# Patient Record
Sex: MALE | Race: WHITE | NOT HISPANIC OR LATINO | Employment: FULL TIME | URBAN - METROPOLITAN AREA
[De-identification: names, ages, dates, MRNs, and addresses within clinical notes are randomized per-mention and may not be internally consistent; named-entity substitution may affect disease eponyms.]

---

## 2024-05-14 ENCOUNTER — LAB (OUTPATIENT)
Dept: LAB | Facility: LAB | Age: 64
End: 2024-05-14
Payer: COMMERCIAL

## 2024-05-14 ENCOUNTER — OFFICE VISIT (OUTPATIENT)
Dept: PRIMARY CARE | Facility: CLINIC | Age: 64
End: 2024-05-14
Payer: COMMERCIAL

## 2024-05-14 VITALS
OXYGEN SATURATION: 98 % | SYSTOLIC BLOOD PRESSURE: 136 MMHG | DIASTOLIC BLOOD PRESSURE: 86 MMHG | HEIGHT: 65 IN | WEIGHT: 205 LBS | HEART RATE: 69 BPM | BODY MASS INDEX: 34.16 KG/M2

## 2024-05-14 DIAGNOSIS — Z13.6 SCREENING FOR HEART DISEASE: ICD-10-CM

## 2024-05-14 DIAGNOSIS — J30.89 ENVIRONMENTAL AND SEASONAL ALLERGIES: ICD-10-CM

## 2024-05-14 DIAGNOSIS — Z12.5 SCREENING FOR PROSTATE CANCER: ICD-10-CM

## 2024-05-14 DIAGNOSIS — R53.83 OTHER FATIGUE: Primary | ICD-10-CM

## 2024-05-14 DIAGNOSIS — M54.2 NECK PAIN: ICD-10-CM

## 2024-05-14 DIAGNOSIS — Z12.11 ENCOUNTER FOR SCREENING FOR MALIGNANT NEOPLASM OF COLON: ICD-10-CM

## 2024-05-14 DIAGNOSIS — R53.83 OTHER FATIGUE: ICD-10-CM

## 2024-05-14 LAB
ALBUMIN SERPL BCP-MCNC: 4.3 G/DL (ref 3.4–5)
ALP SERPL-CCNC: 42 U/L (ref 33–136)
ALT SERPL W P-5'-P-CCNC: 17 U/L (ref 10–52)
ANION GAP SERPL CALC-SCNC: 9 MMOL/L (ref 10–20)
AST SERPL W P-5'-P-CCNC: 17 U/L (ref 9–39)
BASOPHILS # BLD AUTO: 0.03 X10*3/UL (ref 0–0.1)
BASOPHILS NFR BLD AUTO: 0.5 %
BILIRUB SERPL-MCNC: 1.4 MG/DL (ref 0–1.2)
BUN SERPL-MCNC: 22 MG/DL (ref 6–23)
CALCIUM SERPL-MCNC: 8.7 MG/DL (ref 8.6–10.3)
CHLORIDE SERPL-SCNC: 107 MMOL/L (ref 98–107)
CO2 SERPL-SCNC: 28 MMOL/L (ref 21–32)
CREAT SERPL-MCNC: 1.12 MG/DL (ref 0.5–1.3)
EGFRCR SERPLBLD CKD-EPI 2021: 75 ML/MIN/1.73M*2
EOSINOPHIL # BLD AUTO: 0.18 X10*3/UL (ref 0–0.7)
EOSINOPHIL NFR BLD AUTO: 3.3 %
ERYTHROCYTE [DISTWIDTH] IN BLOOD BY AUTOMATED COUNT: 13.1 % (ref 11.5–14.5)
GLUCOSE SERPL-MCNC: 109 MG/DL (ref 74–99)
HCT VFR BLD AUTO: 45.3 % (ref 41–52)
HGB BLD-MCNC: 14.9 G/DL (ref 13.5–17.5)
IMM GRANULOCYTES # BLD AUTO: 0.01 X10*3/UL (ref 0–0.7)
IMM GRANULOCYTES NFR BLD AUTO: 0.2 % (ref 0–0.9)
LYMPHOCYTES # BLD AUTO: 1.33 X10*3/UL (ref 1.2–4.8)
LYMPHOCYTES NFR BLD AUTO: 24.3 %
MCH RBC QN AUTO: 28.9 PG (ref 26–34)
MCHC RBC AUTO-ENTMCNC: 32.9 G/DL (ref 32–36)
MCV RBC AUTO: 88 FL (ref 80–100)
MONOCYTES # BLD AUTO: 0.47 X10*3/UL (ref 0.1–1)
MONOCYTES NFR BLD AUTO: 8.6 %
NEUTROPHILS # BLD AUTO: 3.45 X10*3/UL (ref 1.2–7.7)
NEUTROPHILS NFR BLD AUTO: 63.1 %
NRBC BLD-RTO: 0 /100 WBCS (ref 0–0)
PLATELET # BLD AUTO: 197 X10*3/UL (ref 150–450)
POTASSIUM SERPL-SCNC: 4.1 MMOL/L (ref 3.5–5.3)
PROT SERPL-MCNC: 6.2 G/DL (ref 6.4–8.2)
PSA SERPL-MCNC: 1.19 NG/ML
RBC # BLD AUTO: 5.15 X10*6/UL (ref 4.5–5.9)
SODIUM SERPL-SCNC: 140 MMOL/L (ref 136–145)
TSH SERPL-ACNC: 3.1 MIU/L (ref 0.44–3.98)
WBC # BLD AUTO: 5.5 X10*3/UL (ref 4.4–11.3)

## 2024-05-14 PROCEDURE — 36415 COLL VENOUS BLD VENIPUNCTURE: CPT

## 2024-05-14 PROCEDURE — 85025 COMPLETE CBC W/AUTO DIFF WBC: CPT

## 2024-05-14 PROCEDURE — 84443 ASSAY THYROID STIM HORMONE: CPT

## 2024-05-14 PROCEDURE — 1036F TOBACCO NON-USER: CPT | Performed by: INTERNAL MEDICINE

## 2024-05-14 PROCEDURE — 80053 COMPREHEN METABOLIC PANEL: CPT

## 2024-05-14 PROCEDURE — 84153 ASSAY OF PSA TOTAL: CPT

## 2024-05-14 PROCEDURE — 99203 OFFICE O/P NEW LOW 30 MIN: CPT | Performed by: INTERNAL MEDICINE

## 2024-05-14 ASSESSMENT — PATIENT HEALTH QUESTIONNAIRE - PHQ9
2. FEELING DOWN, DEPRESSED OR HOPELESS: NOT AT ALL
SUM OF ALL RESPONSES TO PHQ9 QUESTIONS 1 AND 2: 0
1. LITTLE INTEREST OR PLEASURE IN DOING THINGS: NOT AT ALL

## 2024-05-14 ASSESSMENT — ENCOUNTER SYMPTOMS
UNEXPECTED WEIGHT CHANGE: 0
ABDOMINAL PAIN: 0
VOMITING: 0
SHORTNESS OF BREATH: 0
FATIGUE: 1
BLOOD IN STOOL: 0
DIARRHEA: 0
PALPITATIONS: 0
BACK PAIN: 0
CHEST TIGHTNESS: 0
WHEEZING: 0
NAUSEA: 0
ARTHRALGIAS: 0
COUGH: 0

## 2024-05-14 NOTE — ASSESSMENT & PLAN NOTE
-He injured his neck on a tree branch when he was mowing and he is currently seeing a chiropractor for care.  He states he still has some soreness and occasionally he feels some fleeting dizziness or lightheadedness  -We will discuss his symptoms in more detail when he returns

## 2024-05-14 NOTE — ASSESSMENT & PLAN NOTE
-He is taking an over-the-counter antihistamine and at this time he feels like it is satisfactorily controlling his symptoms

## 2024-05-14 NOTE — PROGRESS NOTES
Subjective   Patient ID: Davey Reed is a 63 y.o. male who presents for [facility].  HPI  He is here today to get reestablished.  He indicates that he was coming to see me over a decade ago and then just got busy and was not getting regular healthcare checkups.  He does admit to having seen a dermatologist this last year for his freckles and moles and he did have a few lesions removed.  He also had a cholesterol blood test done through his workplace and as he recalls it came back okay.  He will try to bring a copy for his chart here.  We reviewed his past medical history and he states that he has been doing okay with the exception of some allergies which he experiences all year-round.  Is particularly worse in the summer and part of his work is mowing lawns.  He states he is taking an over-the-counter medication right now which she feels is satisfactory.  He also states that he injured his neck recently when he was mowing and he ran into a tree branch.  He states his neck has been sore but has been following with a chiropractor and he feels like he is getting better.  He states occasionally he feels a little bit lightheaded when he became concerned about his heart.  He states his mother had heart problems but he does not exactly remember what it was.  I have decided to order a coronary calcium scan and of course we will be doing lab work.  He will also bring me a copy of his cholesterol profile.  We talked about cancer screening and he is agreeable to scheduling with GI for colonoscopy and we will also do a PSA for prostate cancer screening.  We will see him back in follow-up.  Review of Systems   Constitutional:  Positive for fatigue. Negative for unexpected weight change.   Respiratory:  Negative for cough, chest tightness, shortness of breath and wheezing.    Cardiovascular:  Negative for chest pain, palpitations and leg swelling.   Gastrointestinal:  Negative for abdominal pain, blood in stool,  diarrhea, nausea and vomiting.   Musculoskeletal:  Negative for arthralgias and back pain.     Objective   Physical Exam  Vitals and nursing note reviewed.   Constitutional:       General: He is not in acute distress.     Appearance: Normal appearance.   HENT:      Head: Normocephalic and atraumatic.      Right Ear: Tympanic membrane normal.      Left Ear: Tympanic membrane normal.      Mouth/Throat:      Mouth: Mucous membranes are moist.   Eyes:      Conjunctiva/sclera: Conjunctivae normal.   Cardiovascular:      Rate and Rhythm: Normal rate and regular rhythm.      Heart sounds: Normal heart sounds.   Pulmonary:      Effort: No respiratory distress.      Breath sounds: No wheezing.   Abdominal:      Palpations: Abdomen is soft.      Tenderness: There is no abdominal tenderness. There is no guarding.   Musculoskeletal:         General: No swelling. Normal range of motion.   Skin:     General: Skin is warm and dry.   Neurological:      General: No focal deficit present.      Mental Status: He is alert and oriented to person, place, and time.   Psychiatric:         Behavior: Behavior normal.       Assessment/Plan   Problem List Items Addressed This Visit             ICD-10-CM    Other fatigue - Primary R53.83    Relevant Orders    Comprehensive Metabolic Panel    CBC and Auto Differential    TSH    Screening for heart disease Z13.6     -He states he thinks his mother had heart issues so I will be ordering a coronary calcium scan and we will see him back to go over the result         Relevant Orders    CT cardiac scoring wo IV contrast    Environmental and seasonal allergies J30.89     -He is taking an over-the-counter antihistamine and at this time he feels like it is satisfactorily controlling his symptoms         Neck pain M54.2     -He injured his neck on a tree branch when he was mowing and he is currently seeing a chiropractor for care.  He states he still has some soreness and occasionally he feels some  fleeting dizziness or lightheadedness  -We will discuss his symptoms in more detail when he returns               Tg Scott, DO

## 2024-05-14 NOTE — ASSESSMENT & PLAN NOTE
-He states he thinks his mother had heart issues so I will be ordering a coronary calcium scan and we will see him back to go over the result

## 2024-05-14 NOTE — PATIENT INSTRUCTIONS
I am delighted that you are here to get caught up with your healthcare and we have ordered several things for your assessment  Please remember to bring a copy of your recent cholesterol profile  I have ordered some lab work to be done and I would like for you to go fasting.  Fasting means not having anything with calories for 8 to 12 hours prior to your lab draw.  This means if you decided to go to the lab at 7 AM in the morning you would simply cut off anything with calories after 7 PM the night before.  You can have water and in fact we encourage you to drink lots of water so that the blood drawl will be easier  Please try to get this lab done at your earliest convenience so that we can go over the results when you come back  The staff will be helping you to schedule your coronary calcium scan which is free of charge and this is a tool to screen for heart disease.  I would like to see you back after all of these tests get done  The staff will also be helping you to schedule with gastroenterology to get your screening colonoscopy

## 2024-05-15 DIAGNOSIS — Z12.11 COLON CANCER SCREENING: ICD-10-CM

## 2024-05-23 ENCOUNTER — HOSPITAL ENCOUNTER (OUTPATIENT)
Dept: RADIOLOGY | Facility: HOSPITAL | Age: 64
Discharge: HOME | End: 2024-05-23
Payer: COMMERCIAL

## 2024-05-23 DIAGNOSIS — Z13.6 SCREENING FOR HEART DISEASE: ICD-10-CM

## 2024-05-23 PROCEDURE — 75571 CT HRT W/O DYE W/CA TEST: CPT

## 2024-05-29 ENCOUNTER — OFFICE VISIT (OUTPATIENT)
Dept: PRIMARY CARE | Facility: CLINIC | Age: 64
End: 2024-05-29
Payer: COMMERCIAL

## 2024-05-29 VITALS
SYSTOLIC BLOOD PRESSURE: 132 MMHG | DIASTOLIC BLOOD PRESSURE: 82 MMHG | WEIGHT: 206 LBS | OXYGEN SATURATION: 97 % | HEIGHT: 65 IN | BODY MASS INDEX: 34.32 KG/M2 | HEART RATE: 64 BPM

## 2024-05-29 DIAGNOSIS — R10.31 RIGHT INGUINAL PAIN: ICD-10-CM

## 2024-05-29 DIAGNOSIS — Z12.5 SCREENING FOR PROSTATE CANCER: ICD-10-CM

## 2024-05-29 DIAGNOSIS — R73.9 HYPERGLYCEMIA: ICD-10-CM

## 2024-05-29 DIAGNOSIS — E78.5 HYPERLIPIDEMIA, UNSPECIFIED HYPERLIPIDEMIA TYPE: Primary | ICD-10-CM

## 2024-05-29 PROBLEM — Z13.6 SCREENING FOR HEART DISEASE: Status: RESOLVED | Noted: 2024-05-14 | Resolved: 2024-05-29

## 2024-05-29 PROCEDURE — 1036F TOBACCO NON-USER: CPT | Performed by: INTERNAL MEDICINE

## 2024-05-29 PROCEDURE — 99214 OFFICE O/P EST MOD 30 MIN: CPT | Performed by: INTERNAL MEDICINE

## 2024-05-29 NOTE — ASSESSMENT & PLAN NOTE
-As we were finishing his note he indicated that he is having some groin pain and points to the right groin region.  He states that approximately 10 years ago he had a prior herniorrhaphy and he is having some discomfort there.  He states he is not sure if he just simply strained the area because of the way he moves or slipped but despite the passage of time he continues to have symptoms for the past month.  When I palpate the region I can see the scar and I do not feel a mass but I decided to send him for CT scan and I will be calling him with results

## 2024-05-29 NOTE — PATIENT INSTRUCTIONS
As we discussed overall I am pleased with your test results today  The staff will be giving you a handout that goes over ways to improve your cholesterol and please read over these carefully.  We invite you to return in 1 year and just prior to that visit we will be checking your cholesterol.  We will also be checking your blood sugar and please remember to go fasting for the lab work.  We will also be checking a PSA which is recommended once a year for men.  Please do not hesitate to reach out if you have any questions or concerns and I am glad that you are scheduled to get your colonoscopy  As we discussed the staff will be trying to get an approval for a CT scan of your right groin region and once the results are known I will contact you.  If you do not hear from me within several days of your CT scan please call if you do not hear from our office about scheduling a CT scan please call

## 2024-05-29 NOTE — ASSESSMENT & PLAN NOTE
-He will work on dietary modification and were giving him handouts today  -We will recheck his cholesterol in 1 year just prior to his follow-up visit

## 2024-05-29 NOTE — PROGRESS NOTES
Subjective   Patient ID: Davey Reed is a 63 y.o. male who presents for Follow-up (2 week FUV. Still complains about a possible hernia pain on his right lower side, ).  HPI  He is here today for follow-up.  He has completed his testing and we first went over his coronary calcium scan.  His composite score was only 0.31.  We talked about the significance of these findings and his risk.  He understands that this test is not 100% but it is most of the time highly predictive.  He also was able to provide a copy of his recent cholesterol profile and for the most part his numbers looked satisfactory.  His triglycerides were up slightly.  I will give him a handout today that goes over ways to reduce his cholesterol and reduce his risk of heart disease in the future.  We also reviewed his most recent laboratory test results and his blood glucose was slightly raised at 109.  I do remind him that eating a healthy diet, exercising regularly, and staying close to ideal body weight will reduce his risk of heart disease in the future.  He is scheduled to have his colonoscopy in the near future.  We decided that he can safely return on an annual basis and sooner if any problems.   At the end of our session he had 1 other concerns.  He has been experiencing some pain and he points to his right groin region where he has had prior hernia surgery.  He had a herniorrhaphy approximately 10 years ago and he is feeling discomfort in that region.  He states he thinks he might have strained his groin when he slipped or moved a certain way but he states the symptoms have been present for a month and do not resolve.  I will be sending him for a CT scan and we will call him with results.  Objective   Physical Exam  Vitals and nursing note reviewed.   Constitutional:       General: He is not in acute distress.     Appearance: Normal appearance.   HENT:      Head: Normocephalic and atraumatic.   Eyes:      Conjunctiva/sclera: Conjunctivae  normal.   Cardiovascular:      Rate and Rhythm: Normal rate and regular rhythm.      Heart sounds: Normal heart sounds.   Pulmonary:      Effort: No respiratory distress.      Breath sounds: No wheezing.   Abdominal:      Palpations: Abdomen is soft.      Tenderness: There is no abdominal tenderness. There is no guarding.   Musculoskeletal:         General: No swelling. Normal range of motion.   Skin:     General: Skin is warm and dry.   Neurological:      General: No focal deficit present.      Mental Status: He is alert and oriented to person, place, and time.   Psychiatric:         Behavior: Behavior normal.       Recent Results (from the past 672 hour(s))   Comprehensive Metabolic Panel    Collection Time: 05/14/24 10:08 AM   Result Value Ref Range    Glucose 109 (H) 74 - 99 mg/dL    Sodium 140 136 - 145 mmol/L    Potassium 4.1 3.5 - 5.3 mmol/L    Chloride 107 98 - 107 mmol/L    Bicarbonate 28 21 - 32 mmol/L    Anion Gap 9 (L) 10 - 20 mmol/L    Urea Nitrogen 22 6 - 23 mg/dL    Creatinine 1.12 0.50 - 1.30 mg/dL    eGFR 75 >60 mL/min/1.73m*2    Calcium 8.7 8.6 - 10.3 mg/dL    Albumin 4.3 3.4 - 5.0 g/dL    Alkaline Phosphatase 42 33 - 136 U/L    Total Protein 6.2 (L) 6.4 - 8.2 g/dL    AST 17 9 - 39 U/L    Bilirubin, Total 1.4 (H) 0.0 - 1.2 mg/dL    ALT 17 10 - 52 U/L   CBC and Auto Differential    Collection Time: 05/14/24 10:08 AM   Result Value Ref Range    WBC 5.5 4.4 - 11.3 x10*3/uL    nRBC 0.0 0.0 - 0.0 /100 WBCs    RBC 5.15 4.50 - 5.90 x10*6/uL    Hemoglobin 14.9 13.5 - 17.5 g/dL    Hematocrit 45.3 41.0 - 52.0 %    MCV 88 80 - 100 fL    MCH 28.9 26.0 - 34.0 pg    MCHC 32.9 32.0 - 36.0 g/dL    RDW 13.1 11.5 - 14.5 %    Platelets 197 150 - 450 x10*3/uL    Neutrophils % 63.1 40.0 - 80.0 %    Immature Granulocytes %, Automated 0.2 0.0 - 0.9 %    Lymphocytes % 24.3 13.0 - 44.0 %    Monocytes % 8.6 2.0 - 10.0 %    Eosinophils % 3.3 0.0 - 6.0 %    Basophils % 0.5 0.0 - 2.0 %    Neutrophils Absolute 3.45 1.20 - 7.70  x10*3/uL    Immature Granulocytes Absolute, Automated 0.01 0.00 - 0.70 x10*3/uL    Lymphocytes Absolute 1.33 1.20 - 4.80 x10*3/uL    Monocytes Absolute 0.47 0.10 - 1.00 x10*3/uL    Eosinophils Absolute 0.18 0.00 - 0.70 x10*3/uL    Basophils Absolute 0.03 0.00 - 0.10 x10*3/uL   TSH    Collection Time: 05/14/24 10:08 AM   Result Value Ref Range    Thyroid Stimulating Hormone 3.10 0.44 - 3.98 mIU/L   Prostate Spec.Ag,Screen    Collection Time: 05/14/24 10:08 AM   Result Value Ref Range    Prostate Specific Antigen,Screen 1.19 <=4.00 ng/mL       Assessment/Plan   Problem List Items Addressed This Visit             ICD-10-CM    Screening for prostate cancer Z12.5     -His PSA was in satisfactory range of 1.19 and we will check it again in 1 year         Relevant Orders    Prostate Spec.Ag,Screen    Hyperlipidemia - Primary E78.5     -He will work on dietary modification and were giving him handouts today  -We will recheck his cholesterol in 1 year just prior to his follow-up visit         Relevant Orders    Lipid Panel    Hemoglobin A1C    Hyperglycemia R73.9     -His fasting glucose was 109 and we talked about the importance of eating a healthy diet, exercising regularly, and trying to get as close to ideal body weight as possible  We will be checking a fasting glucose and hemoglobin A1c just prior to his next follow-up visit-         Relevant Orders    Basic Metabolic Panel    Hemoglobin A1C    Right inguinal pain R10.31     -As we were finishing his note he indicated that he is having some groin pain and points to the right groin region.  He states that approximately 10 years ago he had a prior herniorrhaphy and he is having some discomfort there.  He states he is not sure if he just simply strained the area because of the way he moves or slipped but despite the passage of time he continues to have symptoms for the past month.  When I palpate the region I can see the scar and I do not feel a mass but I decided to send  him for CT scan and I will be calling him with results         Relevant Orders    CT abdomen pelvis w and wo IV contrast          Tg Scott, DO

## 2024-05-29 NOTE — ASSESSMENT & PLAN NOTE
-His fasting glucose was 109 and we talked about the importance of eating a healthy diet, exercising regularly, and trying to get as close to ideal body weight as possible  We will be checking a fasting glucose and hemoglobin A1c just prior to his next follow-up visit-

## 2024-06-03 ENCOUNTER — TELEPHONE (OUTPATIENT)
Dept: PRIMARY CARE | Facility: CLINIC | Age: 64
End: 2024-06-03
Payer: COMMERCIAL

## 2024-06-03 DIAGNOSIS — R10.31 RIGHT INGUINAL PAIN: Primary | ICD-10-CM

## 2024-06-03 NOTE — TELEPHONE ENCOUNTER
Patient's insurance denied prior authorization for CT abd/pelvis. They are requiring an ultrasound be done first.    Next step?

## 2024-06-07 ENCOUNTER — HOSPITAL ENCOUNTER (OUTPATIENT)
Dept: RADIOLOGY | Facility: HOSPITAL | Age: 64
Discharge: HOME | End: 2024-06-07
Payer: COMMERCIAL

## 2024-06-07 DIAGNOSIS — R10.31 RIGHT INGUINAL PAIN: ICD-10-CM

## 2024-06-07 PROCEDURE — 76882 US LMTD JT/FCL EVL NVASC XTR: CPT | Performed by: STUDENT IN AN ORGANIZED HEALTH CARE EDUCATION/TRAINING PROGRAM

## 2024-06-07 PROCEDURE — 76882 US LMTD JT/FCL EVL NVASC XTR: CPT

## 2024-06-11 NOTE — RESULT ENCOUNTER NOTE
I called his house to let him know about his ultrasound results and his wife Lucia answered the phone.  She states he works third shift and is still asleep.  She is his HIPAA contact.  I explained to her that the ultrasound of his groin did not show any evidence of a hernia.  We talked about the possibility of his hip being the culprit and when he wakes up she is going to give him the message about the ultrasound and ask him to relay to us how he is doing with his symptoms.  If his symptoms are resolving or almost resolved then we do not need to do anything else but if he still has significant discomfort I have suggested we do an x-ray.  She states she will make sure he gets back with us regarding that issue.

## 2024-06-21 ENCOUNTER — HOSPITAL ENCOUNTER (OUTPATIENT)
Dept: GASTROENTEROLOGY | Facility: CLINIC | Age: 64
Setting detail: OUTPATIENT SURGERY
Discharge: HOME | End: 2024-06-21
Payer: COMMERCIAL

## 2024-06-21 VITALS
RESPIRATION RATE: 16 BRPM | BODY MASS INDEX: 32.24 KG/M2 | SYSTOLIC BLOOD PRESSURE: 118 MMHG | HEART RATE: 68 BPM | HEIGHT: 66 IN | DIASTOLIC BLOOD PRESSURE: 76 MMHG | TEMPERATURE: 98.1 F | OXYGEN SATURATION: 95 % | WEIGHT: 200.62 LBS

## 2024-06-21 DIAGNOSIS — Z12.11 COLON CANCER SCREENING: ICD-10-CM

## 2024-06-21 DIAGNOSIS — Z12.11 ENCOUNTER FOR SCREENING FOR MALIGNANT NEOPLASM OF COLON: ICD-10-CM

## 2024-06-21 PROCEDURE — 2500000004 HC RX 250 GENERAL PHARMACY W/ HCPCS (ALT 636 FOR OP/ED): Performed by: INTERNAL MEDICINE

## 2024-06-21 PROCEDURE — 45378 DIAGNOSTIC COLONOSCOPY: CPT | Performed by: INTERNAL MEDICINE

## 2024-06-21 PROCEDURE — 3700000012 HC SEDATION LEVEL 5+ TIME - INITIAL 15 MINUTES 5/> YEARS

## 2024-06-21 PROCEDURE — 7100000009 HC PHASE TWO TIME - INITIAL BASE CHARGE

## 2024-06-21 PROCEDURE — 7100000010 HC PHASE TWO TIME - EACH INCREMENTAL 1 MINUTE

## 2024-06-21 RX ORDER — MEPERIDINE HYDROCHLORIDE 25 MG/ML
INJECTION INTRAMUSCULAR; INTRAVENOUS; SUBCUTANEOUS AS NEEDED
Status: COMPLETED | OUTPATIENT
Start: 2024-06-21 | End: 2024-06-21

## 2024-06-21 RX ORDER — MIDAZOLAM HYDROCHLORIDE 5 MG/ML
INJECTION, SOLUTION INTRAMUSCULAR; INTRAVENOUS AS NEEDED
Status: COMPLETED | OUTPATIENT
Start: 2024-06-21 | End: 2024-06-21

## 2024-06-21 RX ORDER — SODIUM CHLORIDE, SODIUM LACTATE, POTASSIUM CHLORIDE, CALCIUM CHLORIDE 600; 310; 30; 20 MG/100ML; MG/100ML; MG/100ML; MG/100ML
20 INJECTION, SOLUTION INTRAVENOUS CONTINUOUS
Status: DISCONTINUED | OUTPATIENT
Start: 2024-06-21 | End: 2024-06-22 | Stop reason: HOSPADM

## 2024-06-21 ASSESSMENT — PAIN SCALES - GENERAL
PAINLEVEL_OUTOF10: 0 - NO PAIN
PAINLEVEL_OUTOF10: 3
PAINLEVEL_OUTOF10: 0 - NO PAIN

## 2024-06-21 ASSESSMENT — COLUMBIA-SUICIDE SEVERITY RATING SCALE - C-SSRS
2. HAVE YOU ACTUALLY HAD ANY THOUGHTS OF KILLING YOURSELF?: NO
6. HAVE YOU EVER DONE ANYTHING, STARTED TO DO ANYTHING, OR PREPARED TO DO ANYTHING TO END YOUR LIFE?: NO
1. IN THE PAST MONTH, HAVE YOU WISHED YOU WERE DEAD OR WISHED YOU COULD GO TO SLEEP AND NOT WAKE UP?: NO

## 2024-06-21 ASSESSMENT — PAIN DESCRIPTION - DESCRIPTORS: DESCRIPTORS: DISCOMFORT

## 2024-06-21 ASSESSMENT — PAIN - FUNCTIONAL ASSESSMENT
PAIN_FUNCTIONAL_ASSESSMENT: 0-10

## 2024-06-21 NOTE — DISCHARGE INSTRUCTIONS
Patient Instructions after a Colonoscopy      The anesthetics, sedatives or narcotics which were given to you today will be acting in your body for the next 24 hours, so you might feel a little sleepy or groggy.  This feeling should slowly wear off. Carefully read and follow the instructions.     You received sedation today:  - Do not drive or operate any machinery or power tools of any kind.   - No alcoholic beverages today, not even beer or wine.  - Do not make any important decisions or sign any legal documents.  - No over the counter medications that contain alcohol or that may cause drowsiness.  - Do not make any important decisions or sign any legal documents.  - Make sure you have someone with you for first 24 hours.    While it is common to experience mild to moderate abdominal distention, gas, or belching after your procedure, if any of these symptoms occur following discharge from the GI Lab or within one week of having your procedure, call the [facility] to be advised whether a visit to your nearest Urgent Care or Emergency Department is indicated.  Take this paper with you if you go.     - If you develop an allergic reaction to the medications that were given during your procedure such as difficulty breathing, rash, hives, severe nausea, vomiting or lightheadedness.  - If you experience chest pain, shortness of breath, severe abdominal pain, fevers and chills.  -If you develop signs and symptoms of bleeding such as blood in your spit, if your stools turn black, tarry, or bloody  - If you have not urinated within 8 hours following your procedure.  - If your IV site becomes painful, red, inflamed, or looks infected.    If you received a biopsy/polypectomy/sphincterotomy the following instructions apply below:    __ Do not use Aspirin containing products, non-steroidal medications or anti-coagulants for one week following your procedure. (Examples of these types of medications are: Advil,  Arthrotec, Aleve, Coumadin, Ecotrin, Heparin, Ibuprofen, Indocin, Motrin, Naprosyn, Nuprin, Plavix, Vioxx, and Voltarin, or their generic forms.  This list is not all-inclusive.  Check with your physician or pharmacist before resuming medications.)   __ Eat a soft diet today.  Avoid foods that are poorly digested for the next 24 hours.  These foods would include: nuts, beans, lettuce, red meats, and fried foods. Start with liquids and advance your diet as tolerated, gradually work up to eating solids.   __ Do not have a Barium Study or Enema for one week.    Your physician recommends the additional following instructions:    -You have a contact number available for emergencies. The signs and symptoms of potential delayed complications were discussed with you. You may return to normal activities tomorrow.  -Resume your previous diet.  -Continue your present medications.   -We are waiting for your pathology results.  -Your physician has recommended a repeat colonoscopy (date to be determined after pending pathology results are reviewed) for surveillance based on pathology results.  -The findings and recommendations have been discussed with you.  -The findings and recommendations were discussed with your family.  - Please see Medication Reconciliation Form for new medication/medications prescribed.       If you experience any problems or have any questions following discharge from the GI Lab, please call:        Nurse Signature                                                                        Date___________________                                                                            Patient/Responsible Party Signature                                        Date___________________

## 2024-06-21 NOTE — H&P
History Of Present Illness  Davey Reed is a 64 y.o. male presenting with colon cancer screening.     Past Medical History  Past Medical History:   Diagnosis Date    Asthma ([facility])     GERD (gastroesophageal reflux disease)      Surgical History  Past Surgical History:   Procedure Laterality Date    HAND SURGERY      1992    HERNIA REPAIR Bilateral     NASAL SEPTUM SURGERY       Social History  He reports that he has never smoked. He has never used smokeless tobacco. He reports current alcohol use. He reports that he does not use drugs.    Family History  No family history on file.     Allergies  No Known Allergies  Review of Systems  Pre-sedation Evaluation:  ASA Classification - ASA 2 - Patient with mild systemic disease with no functional limitations  Mallampati Score - II (hard and soft palate, upper portion of tonsils and uvula visible)    Physical Exam  Vitals and nursing note reviewed.   Constitutional:       Appearance: Normal appearance.   HENT:      Head: Normocephalic.      Mouth/Throat:      Mouth: Mucous membranes are moist.      Pharynx: Oropharynx is clear.   Eyes:      Pupils: Pupils are equal, round, and reactive to light.   Cardiovascular:      Rate and Rhythm: Normal rate and regular rhythm.      Heart sounds: Normal heart sounds.   Pulmonary:      Effort: Pulmonary effort is normal.      Breath sounds: Normal breath sounds.   Abdominal:      General: Abdomen is flat. Bowel sounds are normal.      Palpations: Abdomen is soft.   Musculoskeletal:         General: Normal range of motion.      Cervical back: Normal range of motion and neck supple.   Skin:     General: Skin is warm and dry.   Neurological:      General: No focal deficit present.      Mental Status: He is alert and oriented to person, place, and time.   Psychiatric:         Mood and Affect: Mood normal.         Behavior: Behavior normal.          Last Recorded Vitals  Blood pressure (!) 147/94, pulse 69, temperature 36.7 °C (98.1  "°F), temperature source Temporal, resp. rate 16, height 1.676 m (5' 5.98\"), weight 91 kg (200 lb 9.9 oz), SpO2 96%.     Assessment/Plan   Problem List Items Addressed This Visit    None  Visit Diagnoses       Encounter for screening for malignant neoplasm of colon        Relevant Orders    Referral to Gastroenterology    Colon cancer screening        Relevant Orders    Colonoscopy Screening; Average Risk Patient               PTA/Current Medications:  (Not in a hospital admission)    No current outpatient medications on file.     Current Facility-Administered Medications   Medication Dose Route Frequency Provider Last Rate Last Admin    lactated Ringer's infusion  20 mL/hr intravenous Continuous DO Jah Ritchie DO  " No complaints